# Patient Record
(demographics unavailable — no encounter records)

---

## 2024-11-04 NOTE — PHYSICAL EXAM
[Normal Sclera/Conjunctiva] : normal sclera/conjunctiva [Normal Oropharynx] : the oropharynx was normal [No Edema] : there was no peripheral edema [Normal Gait] : normal gait [Alert and Oriented x3] : oriented to person, place, and time [Normal] : affect was normal and insight and judgment were intact [de-identified] : tender enlarged lymph node on anterior cervical area

## 2024-11-04 NOTE — HISTORY OF PRESENT ILLNESS
[FreeTextEntry8] : A 28-year-old female presents as a new patient for acute/urgent visit. She lives in [place], [place] and is visiting her parents who live in [place].   She describes a gradual onset of symptoms over the past three days, starting with a sore throat, which she initially attributed to seasonal allergies. She then started to have an accompanying cough and sore throat. About 1-2 hours prior to the visit, she experienced chest pain on the left side, which she thought might be gas or anxiety, along with a fluttering sensation while standing. The chest pain, which began around 11:30 AM, worsens with physical activity, leaving her feeling "winded."  She denies experiencing fever, chills, nausea, vomiting, diarrhea, abdominal pain, or urinary symptoms, and reports no abnormal vaginal discharge. The patient has a history of interstitial cystitis, with her last flare-up occurring on Friday. Although she has had elevated cholesterol in the past, it has since returned to normal through diet and exercise. She also reports body aches and headaches, for which she took Advil to help her sleep.  Her current medications include oral birth control (one pill daily) 100 mg of spironolactone, and Zyrtec as needed. She has no swelling or pain in her legs and denies any recent long flights or car rides. Notably, her mother has experienced similar symptoms she is presenting with and has asthma and severe allergies but was not tested for COVID-19. The patient has not received any recent vaccinations, having had a COVID-19 booster a few years ago, but she has not had a flu vaccine in the last two years. She works remotely in business development.

## 2024-11-04 NOTE — END OF VISIT
[FreeTextEntry3] : I spent an estimated total time of 40 minutes on this encounter on the date clinical services were rendered. This includes both face-to-face time and non-face-to-face time spent reviewing the patient's chart, prior lab results, relevant imaging/diagnostic studies, and documentation in the EHR. An estimated total of 10 minutes were spent answering the patient's and/or any family member's questions.

## 2024-11-04 NOTE — ASSESSMENT
[FreeTextEntry1] : #Acute Cough Likely viral influenza vs covid, nasopharynx pcr swab complete and will f/u results. Vitals stable.  -Rec OTC dayquil/nyquil for symptom relief -Maintain adequate hydration, rest and recovery.  -check U/A to r/o UTI given hx of interstitial cystitis  #Chest Pain Likely chest congestion related pain. EKG reviewed, NSR, isolated TWI in lead III, otherwise no significant abnormalities. Patient on OCPs.  -Check d-dimer, CBC, CMP,